# Patient Record
Sex: FEMALE | Race: BLACK OR AFRICAN AMERICAN | NOT HISPANIC OR LATINO | Employment: FULL TIME | URBAN - METROPOLITAN AREA
[De-identification: names, ages, dates, MRNs, and addresses within clinical notes are randomized per-mention and may not be internally consistent; named-entity substitution may affect disease eponyms.]

---

## 2023-03-27 PROBLEM — N91.1 SECONDARY AMENORRHEA: Status: ACTIVE | Noted: 2023-03-27

## 2023-03-27 PROBLEM — S89.92XA LEFT KNEE INJURY, INITIAL ENCOUNTER: Status: ACTIVE | Noted: 2023-03-27

## 2023-03-27 PROBLEM — R05.9 COUGH: Status: ACTIVE | Noted: 2023-03-27

## 2023-03-27 PROBLEM — E66.01 MORBID OBESITY WITH BMI OF 50.0-59.9, ADULT (MULTI): Status: ACTIVE | Noted: 2023-03-27

## 2023-03-27 PROBLEM — R73.03 PRE-DIABETES: Status: ACTIVE | Noted: 2023-03-27

## 2023-03-27 PROBLEM — M25.561 RIGHT KNEE PAIN: Status: ACTIVE | Noted: 2023-03-27

## 2023-03-27 PROBLEM — S93.401A SPRAIN OF ANKLE, RIGHT: Status: ACTIVE | Noted: 2023-03-27

## 2023-03-27 PROBLEM — J00 COMMON COLD: Status: ACTIVE | Noted: 2023-03-27

## 2023-03-27 PROBLEM — E66.01 CLASS 3 OBESITY (MULTI): Status: ACTIVE | Noted: 2023-03-27

## 2023-03-27 PROBLEM — S80.02XA CONTUSION OF LEFT KNEE: Status: ACTIVE | Noted: 2023-03-27

## 2023-03-27 PROBLEM — N39.0 UTI (URINARY TRACT INFECTION): Status: ACTIVE | Noted: 2023-03-27

## 2023-03-27 PROBLEM — E66.813 CLASS 3 OBESITY: Status: ACTIVE | Noted: 2023-03-27

## 2023-03-27 PROBLEM — J02.9 SORE THROAT: Status: ACTIVE | Noted: 2023-03-27

## 2023-03-27 PROBLEM — R10.84 GENERALIZED ABDOMINAL PAIN: Status: ACTIVE | Noted: 2023-03-27

## 2023-03-27 PROBLEM — M23.302 MENISCUS, LATERAL, DERANGEMENT: Status: ACTIVE | Noted: 2023-03-27

## 2023-03-27 PROBLEM — S80.212A KNEE ABRASION, LEFT, INITIAL ENCOUNTER: Status: ACTIVE | Noted: 2023-03-27

## 2023-03-27 PROBLEM — E66.9 OBESITY (BMI 30-39.9): Status: ACTIVE | Noted: 2023-03-27

## 2023-03-27 PROBLEM — J03.90 TONSILLITIS: Status: ACTIVE | Noted: 2023-03-27

## 2023-03-27 RX ORDER — ACETAMINOPHEN 500 MG
1 TABLET ORAL DAILY
COMMUNITY
Start: 2022-10-12 | End: 2023-03-28 | Stop reason: SDUPTHER

## 2023-03-28 ENCOUNTER — OFFICE VISIT (OUTPATIENT)
Dept: PRIMARY CARE | Facility: CLINIC | Age: 33
End: 2023-03-28
Payer: COMMERCIAL

## 2023-03-28 VITALS — TEMPERATURE: 95.1 F | DIASTOLIC BLOOD PRESSURE: 90 MMHG | WEIGHT: 286 LBS | SYSTOLIC BLOOD PRESSURE: 130 MMHG

## 2023-03-28 DIAGNOSIS — R03.0 ELEVATED BLOOD PRESSURE READING: Primary | ICD-10-CM

## 2023-03-28 DIAGNOSIS — E55.9 VITAMIN D DEFICIENCY: ICD-10-CM

## 2023-03-28 PROCEDURE — 99213 OFFICE O/P EST LOW 20 MIN: CPT | Performed by: INTERNAL MEDICINE

## 2023-03-28 RX ORDER — ACETAMINOPHEN 500 MG
5000 TABLET ORAL DAILY
Qty: 30 TABLET | Refills: 2 | Status: SHIPPED | OUTPATIENT
Start: 2023-03-28 | End: 2023-04-27

## 2023-03-28 ASSESSMENT — PATIENT HEALTH QUESTIONNAIRE - PHQ9
SUM OF ALL RESPONSES TO PHQ9 QUESTIONS 1 AND 2: 0
2. FEELING DOWN, DEPRESSED OR HOPELESS: NOT AT ALL
1. LITTLE INTEREST OR PLEASURE IN DOING THINGS: NOT AT ALL

## 2023-03-28 NOTE — ADDENDUM NOTE
Addended by: DOUGLAS DA SILVA on: 3/28/2023 03:35 PM     Modules accepted: Level of Service     Epidermal Closure Graft Donor Site (Optional): simple interrupted

## 2023-03-28 NOTE — PROGRESS NOTES
Subjective   Patient ID: Maryam Huddleston is a 33 y.o. female with Pmhx of vitamin D deficiency is presenting for routine follow-up visit and blood pressure checkup.    HPI  She recently had tonsillitis and at urgent care they informed her that her blood pressure was elevated.  She remembered that even during her routine office visits her blood pressure was elevated, this worried her, so she wants us to check her blood pressure and discuss therapy.  Patient's blood pressure during her previous office visits have been in the 140s/90s in the past, weight 300 pound.  Today her blood pressure is 130/90 and her weight has reduced to 286 pounds.  She states that she has enrolled herself in an exercise program and is watching her diet.  She does have a family history of hypertension in her parents.  Denies episodes of dizziness, headaches, sob, sweating.    Review of Systems   All other systems reviewed and are negative.      Objective   Physical Exam  Vitals reviewed.   HENT:      Head: Normocephalic.   Eyes:      Pupils: Pupils are equal, round, and reactive to light.   Cardiovascular:      Rate and Rhythm: Normal rate and regular rhythm.   Pulmonary:      Effort: Pulmonary effort is normal.      Breath sounds: Normal breath sounds.   Musculoskeletal:         General: Normal range of motion.      Cervical back: Neck supple.   Neurological:      Mental Status: She is oriented to person, place, and time.         Assessment/Plan   Problem List Items Addressed This Visit          Circulatory    Elevated blood pressure reading - Primary     #Elevated blood pressure  BP today 130/90  Weight reduced from 300->286lbs    Plan:  -as she is young, has demostrated ability to lose weight and her BP is better controlled this office visit we will not start her on any medication at this point.  -Buy a blood pressure measuring instrument and keep a record of blood pressure measurements at home.  -Continue to exercise and eat  healthy.  -follow-up in 4 months.               Endocrine/Metabolic    Vitamin D deficiency     -has not been taking vit D at home, so there is no point in checking her levels today.         Relevant Medications    cholecalciferol (Vitamin D-3) 5,000 Units tablet

## 2023-03-28 NOTE — ASSESSMENT & PLAN NOTE
#Elevated blood pressure  BP today 130/90  Weight reduced from 300->286lbs    Plan:  -as she is young, has demostrated ability to lose weight and her BP is better controlled this office visit we will not start her on any medication at this point.  -Buy a blood pressure measuring instrument and keep a record of blood pressure measurements at home.  -Continue to exercise and eat healthy.  -follow-up in 4 months.

## 2024-03-19 ENCOUNTER — APPOINTMENT (OUTPATIENT)
Dept: PRIMARY CARE | Facility: CLINIC | Age: 34
End: 2024-03-19
Payer: COMMERCIAL

## 2024-04-02 ENCOUNTER — APPOINTMENT (OUTPATIENT)
Dept: PRIMARY CARE | Facility: CLINIC | Age: 34
End: 2024-04-02
Payer: COMMERCIAL

## 2024-04-16 ENCOUNTER — TELEMEDICINE (OUTPATIENT)
Dept: PRIMARY CARE | Facility: CLINIC | Age: 34
End: 2024-04-16
Payer: COMMERCIAL

## 2024-04-16 DIAGNOSIS — R13.10 ODYNOPHAGIA: Primary | ICD-10-CM

## 2024-04-16 PROCEDURE — 99213 OFFICE O/P EST LOW 20 MIN: CPT | Performed by: INTERNAL MEDICINE

## 2024-04-16 RX ORDER — IBUPROFEN 600 MG/1
600 TABLET ORAL 3 TIMES DAILY PRN
Qty: 60 TABLET | Refills: 0 | Status: SHIPPED | OUTPATIENT
Start: 2024-04-16 | End: 2025-04-16

## 2024-04-16 RX ORDER — LIDOCAINE HYDROCHLORIDE 20 MG/ML
1.25 SOLUTION OROPHARYNGEAL AS NEEDED
Qty: 100 ML | Refills: 0 | Status: SHIPPED | OUTPATIENT
Start: 2024-04-16 | End: 2024-04-26

## 2024-04-16 NOTE — PROGRESS NOTES
"Chief Complaint:   Chief Complaint   Patient presents with    Dysphagia      HPI    Maryam Huddleston is a 34 y.o. year old female who presents to the clinic for odynophagia for about 1 week  No dysphagia  No URI symptoms  No drooling  No fever      Past Medical History   No past medical history on file.   Past Surgical History:   No past surgical history on file.  Family History:   Family History   Problem Relation Name Age of Onset    Other (htn) Mother      Other (htn) Father       Social History:   Tobacco Use: High Risk (10/30/2023)    Received from [facility]    Patient History     Smoking Tobacco Use: Never     Smokeless Tobacco Use: Current     Passive Exposure: Past      Social History     Substance and Sexual Activity   Alcohol Use Not on file        Allergies:   Allergies   Allergen Reactions    Sulfa (Sulfonamide Antibiotics) Unknown        ROS   Review of Systems     Objective   There were no vitals filed for this visit.   BMI Readings from Last 15 Encounters:   No data found for BMI           Physical Exam  Physical Exam     Labs:  CBC:  Recent Labs     03/26/23  0254 03/22/23  2031 10/11/22  1323   WBC 4.1* 5.5 3.6*   HGB 13.4 12.8 13.4   HCT 41.5 40.0 41.5    221 230   MCV 87 89 89     CMP:  Recent Labs     03/26/23  0254 03/22/23  2031 10/11/22  1323    136 139   K 3.7 4.0 3.9    104 107   CO2 26 23 26   ANIONGAP 14 13 10   BUN 9 10 8   CREATININE 0.69 0.75 0.64   GLUCOSE 94 96 102*     Recent Labs     03/26/23  0254 03/22/23  2031 10/11/22  1323 10/12/18  1524   ALBUMIN 4.5 4.0 4.3  --    ALKPHOS 46 47 44  --    ALT 21 21 14  --    AST 21 19 13  --    BILITOT 0.5 0.8 0.7  --    LIPASE  --   --   --  17     Calcium/Phos:   Lab Results   Component Value Date    CALCIUM 8.9 03/26/2023      COAG:   Recent Labs     03/26/23 0254   INR 1.1     CRP: No results found for: \"CRP\"   [unfilled]   ENDO:  Recent Labs     10/11/22  1323   TSH 1.62   HGBA1C 5.0      CARDIAC:   Recent " Labs     03/22/23  2339 03/22/23  2031   TROPHS 4 4     Recent Labs     10/21/22  1100   CHOL 167   LDLF 106*   HDL 45.7   TRIG 78     No data recorded    Current Medications:  Current Outpatient Medications   Medication Sig Dispense Refill    ibuprofen 600 mg tablet Take 1 tablet (600 mg) by mouth 3 times a day as needed for mild pain (1 - 3) (pain). 60 tablet 0    lidocaine (Xylocaine) 2 % solution Take 1.25 mL by mouth if needed for mild pain (1 - 3) for up to 10 days. 100 mL 0     No current facility-administered medications for this visit.       Assessment and Plan  Maryam was seen today for dysphagia.  Diagnoses and all orders for this visit:  Odynophagia (Primary)  -     lidocaine (Xylocaine) 2 % solution; Take 1.25 mL by mouth if needed for mild pain (1 - 3) for up to 10 days.  -     ibuprofen 600 mg tablet; Take 1 tablet (600 mg) by mouth 3 times a day as needed for mild pain (1 - 3) (pain).     Viral pharyngitis vs allergies  Start Lidocaine mouth wash gargles  Start salk water gargles  Use Ibuprofen PRN  Let us know if not better by Friday        Immunizations:  Immunization History   Administered Date(s) Administered    Tdap vaccine, age 7 year and older (BOOSTRIX, ADACEL) 07/29/2010, 09/16/2019     An interactive audio and video telecommunication system which permits real time communications between the patient (at the originating site) and provider (at the distant site) was utilized to provide this telehealth service.    Verbal consent was requested and obtained from the patient on the day of encounter.  This is a virtual visit using HIPAA compliant video platform. It required patient-provider interaction for the medical decision making as documented.     I have communicated my name and active licensure. The patient's identity and physical location were verified at the time of this visit.   Either the patient or their legal representative has been informed of the risks and benefits of -- and  alternatives to -- treatment through a remote evaluation and consents to proceed with the evaluation remotely.

## 2024-04-23 ENCOUNTER — OFFICE VISIT (OUTPATIENT)
Dept: PRIMARY CARE | Facility: CLINIC | Age: 34
End: 2024-04-23
Payer: COMMERCIAL

## 2024-04-23 ENCOUNTER — LAB (OUTPATIENT)
Dept: LAB | Facility: LAB | Age: 34
End: 2024-04-23
Payer: COMMERCIAL

## 2024-04-23 VITALS — DIASTOLIC BLOOD PRESSURE: 70 MMHG | SYSTOLIC BLOOD PRESSURE: 118 MMHG | WEIGHT: 293 LBS

## 2024-04-23 DIAGNOSIS — Z00.00 HEALTH CARE MAINTENANCE: ICD-10-CM

## 2024-04-23 DIAGNOSIS — Z00.00 HEALTH CARE MAINTENANCE: Primary | ICD-10-CM

## 2024-04-23 LAB
25(OH)D3 SERPL-MCNC: 10 NG/ML (ref 30–100)
ALBUMIN SERPL BCP-MCNC: 4.2 G/DL (ref 3.4–5)
ALP SERPL-CCNC: 44 U/L (ref 33–110)
ALT SERPL W P-5'-P-CCNC: 13 U/L (ref 7–45)
ANION GAP SERPL CALC-SCNC: 13 MMOL/L (ref 10–20)
AST SERPL W P-5'-P-CCNC: 11 U/L (ref 9–39)
BILIRUB SERPL-MCNC: 0.7 MG/DL (ref 0–1.2)
BUN SERPL-MCNC: 11 MG/DL (ref 6–23)
CALCIUM SERPL-MCNC: 9.2 MG/DL (ref 8.6–10.6)
CHLORIDE SERPL-SCNC: 104 MMOL/L (ref 98–107)
CHOLEST SERPL-MCNC: 149 MG/DL (ref 0–199)
CHOLESTEROL/HDL RATIO: 3.2
CO2 SERPL-SCNC: 28 MMOL/L (ref 21–32)
CREAT SERPL-MCNC: 0.65 MG/DL (ref 0.5–1.05)
EGFRCR SERPLBLD CKD-EPI 2021: >90 ML/MIN/1.73M*2
ERYTHROCYTE [DISTWIDTH] IN BLOOD BY AUTOMATED COUNT: 13.2 % (ref 11.5–14.5)
EST. AVERAGE GLUCOSE BLD GHB EST-MCNC: 108 MG/DL
GLUCOSE SERPL-MCNC: 90 MG/DL (ref 74–99)
HBA1C MFR BLD: 5.4 %
HCT VFR BLD AUTO: 39.8 % (ref 36–46)
HDLC SERPL-MCNC: 46.3 MG/DL
HGB BLD-MCNC: 12.9 G/DL (ref 12–16)
LDLC SERPL CALC-MCNC: 87 MG/DL
MCH RBC QN AUTO: 28.2 PG (ref 26–34)
MCHC RBC AUTO-ENTMCNC: 32.4 G/DL (ref 32–36)
MCV RBC AUTO: 87 FL (ref 80–100)
NON HDL CHOLESTEROL: 103 MG/DL (ref 0–149)
NRBC BLD-RTO: 0 /100 WBCS (ref 0–0)
PLATELET # BLD AUTO: 253 X10*3/UL (ref 150–450)
POTASSIUM SERPL-SCNC: 4 MMOL/L (ref 3.5–5.3)
PROT SERPL-MCNC: 7.4 G/DL (ref 6.4–8.2)
RBC # BLD AUTO: 4.57 X10*6/UL (ref 4–5.2)
SODIUM SERPL-SCNC: 141 MMOL/L (ref 136–145)
TRIGL SERPL-MCNC: 79 MG/DL (ref 0–149)
TSH SERPL-ACNC: 1.85 MIU/L (ref 0.44–3.98)
VLDL: 16 MG/DL (ref 0–40)
WBC # BLD AUTO: 3.6 X10*3/UL (ref 4.4–11.3)

## 2024-04-23 PROCEDURE — 99395 PREV VISIT EST AGE 18-39: CPT | Performed by: INTERNAL MEDICINE

## 2024-04-23 PROCEDURE — 82306 VITAMIN D 25 HYDROXY: CPT

## 2024-04-23 PROCEDURE — 84443 ASSAY THYROID STIM HORMONE: CPT

## 2024-04-23 PROCEDURE — 83036 HEMOGLOBIN GLYCOSYLATED A1C: CPT

## 2024-04-23 PROCEDURE — 80053 COMPREHEN METABOLIC PANEL: CPT

## 2024-04-23 PROCEDURE — 36415 COLL VENOUS BLD VENIPUNCTURE: CPT

## 2024-04-23 PROCEDURE — 80061 LIPID PANEL: CPT

## 2024-04-23 PROCEDURE — 85027 COMPLETE CBC AUTOMATED: CPT

## 2024-04-23 NOTE — PROGRESS NOTES
Subjective   Patient ID: Maryam Huddleston is a 34 y.o. female with no significant PMHx, who presents for Annual Exam. Patient follows with OBGYN at [facility]. Works at day care center.   Denies acute complaints. Denies HA, n/v, chest pain, sob, abdominal pain, changes to urination or BM. Would like to discuss options for weight loss medications.     Objective   /70   Wt 135 kg (298 lb)     Physical Exam  Constitutional: Appears stated age. In NAD. Obese appearing.  HEENT: NC/AT, EOMI, clear sclera, moist mucous membranes  CV: RRR, No M/R/G  PULM: CTAB, no coughing or wheezing  ABDOMEN: Soft, NT/ND. No TTP. + BSx4.  SKIN: Normal Color, Warm, Dry, No Rashes   EXTREMITIES: Non-Tender, Full ROM, No Pedal Edema  NEURO: A&O x 4, nonfocal neurological exam.  PSYCH: Normal Mood & Behavior    Assessment/Plan   Problem List Items Addressed This Visit    None  Visit Diagnoses       Health care maintenance    -  Primary    Relevant Orders    CBC    Comprehensive metabolic panel    Hemoglobin A1c    Urinalysis with Reflex Microscopic    Albumin, urine, random    Tsh With Reflex To Free T4 If Abnormal    Lipid panel    Vitamin D 25-Hydroxy,Total (for eval of Vitamin D levels)             Maryam Huddleston is a 34 y.o. female with no significant PMHx, who presents for Annual Exam.     #Obesity  - Will discuss weight loss medications after blood work    #HM  - Annual labs ordered  - UTD with vaccines  - UTD with pap smear, HepC screen, HIV screen. Follows with obgyn at [facility].     Follow up in August 2024

## 2024-04-23 NOTE — PROGRESS NOTES
I reviewed the resident/fellow's documentation and discussed the patient with the resident/fellow. I agree with the resident/fellow's medical decision making as documented in the note.  As the attending physician, I certify that I personally reviewed the patient's history and personally examined the patient to confirm the physical findings described above, and that I reviewed the relevant imaging studies and available reports. I also discussed the differential diagnosis and all of the proposed management plans with the patient and individuals accompanying the patient to this visit. They had the opportunity to ask questions about the proposed management plans and to have those questions answered.     Ai Garzon MD

## 2024-08-20 ENCOUNTER — APPOINTMENT (OUTPATIENT)
Dept: PRIMARY CARE | Facility: CLINIC | Age: 34
End: 2024-08-20

## 2025-06-30 ENCOUNTER — OFFICE VISIT (OUTPATIENT)
Dept: URGENT CARE | Age: 35
End: 2025-06-30
Payer: MEDICAID

## 2025-06-30 ENCOUNTER — HOSPITAL ENCOUNTER (OUTPATIENT)
Dept: RADIOLOGY | Facility: CLINIC | Age: 35
Discharge: HOME | End: 2025-06-30
Payer: MEDICAID

## 2025-06-30 VITALS
DIASTOLIC BLOOD PRESSURE: 91 MMHG | HEART RATE: 104 BPM | OXYGEN SATURATION: 96 % | RESPIRATION RATE: 17 BRPM | SYSTOLIC BLOOD PRESSURE: 165 MMHG | TEMPERATURE: 98.6 F

## 2025-06-30 DIAGNOSIS — S89.92XA INJURY OF LEFT KNEE, INITIAL ENCOUNTER: ICD-10-CM

## 2025-06-30 DIAGNOSIS — S89.92XA INJURY OF LEFT LOWER EXTREMITY, INITIAL ENCOUNTER: Primary | ICD-10-CM

## 2025-06-30 DIAGNOSIS — S89.91XA INJURY OF RIGHT LOWER EXTREMITY, INITIAL ENCOUNTER: ICD-10-CM

## 2025-06-30 DIAGNOSIS — S89.92XA INJURY OF LEFT LOWER EXTREMITY, INITIAL ENCOUNTER: ICD-10-CM

## 2025-06-30 PROCEDURE — 73590 X-RAY EXAM OF LOWER LEG: CPT | Mod: LEFT SIDE | Performed by: STUDENT IN AN ORGANIZED HEALTH CARE EDUCATION/TRAINING PROGRAM

## 2025-06-30 PROCEDURE — 73590 X-RAY EXAM OF LOWER LEG: CPT | Mod: LT

## 2025-06-30 PROCEDURE — 73564 X-RAY EXAM KNEE 4 OR MORE: CPT | Mod: LT

## 2025-06-30 PROCEDURE — 73564 X-RAY EXAM KNEE 4 OR MORE: CPT | Mod: LEFT SIDE | Performed by: STUDENT IN AN ORGANIZED HEALTH CARE EDUCATION/TRAINING PROGRAM

## 2025-06-30 PROCEDURE — 73590 X-RAY EXAM OF LOWER LEG: CPT | Mod: RT

## 2025-06-30 ASSESSMENT — ENCOUNTER SYMPTOMS
ARTHRALGIAS: 1
WOUND: 1
CONSTITUTIONAL NEGATIVE: 1

## 2025-06-30 ASSESSMENT — PAIN SCALES - GENERAL: PAINLEVEL_OUTOF10: 7

## 2025-06-30 NOTE — LETTER
June 30, 2025     Patient: Maryam Huddleston   YOB: 1990   Date of Visit: 6/30/2025       To Whom It May Concern:    Maryam Huddleston was seen in my clinic on 6/30/2025 at 10:55 am. Please excuse Maryam for her absence from work on this day to make the appointment.    If you have any questions or concerns, please don't hesitate to call.         Sincerely,         Toni Gonzalez PA-C        CC: No Recipients

## 2025-06-30 NOTE — PROGRESS NOTES
Subjective   Patient ID: Maryam Huddleston is a 35 y.o. female. They present today with a chief complaint of Fall (Coming down stairs in her apartment today. LT and RT leg pain. LT leg has a gash. LT forearm pain ).    History of Present Illness  35-year-old female presents clinic today with complaints of a fall.  Patient states that she fell going down stairs of her apartment today.  She landed on her left knee and bilateral shins.  She states she is having trouble walking due to the pain.  She states she cut her right shin.  Denies any head injury.  Denies LOC.  Denies nausea or vomiting.  Denies dizziness.  She has not taken anything for the pain.      Trauma  Mechanism of injury: Fall       Past Medical History  Allergies as of 06/30/2025 - Reviewed 06/30/2025   Allergen Reaction Noted    Sulfa (sulfonamide antibiotics) Unknown 03/24/2023       Prescriptions Prior to Admission[1]     Medical History[2]    Surgical History[3]     reports that she has never smoked. She has never used smokeless tobacco. She reports that she does not currently use alcohol. She reports that she does not use drugs.    Review of Systems  Review of Systems   Constitutional: Negative.    Musculoskeletal:  Positive for arthralgias.   Skin:  Positive for wound.                                  Objective    Vitals:    06/30/25 1114   BP: (!) 165/91   BP Location: Right arm   Patient Position: Sitting   Pulse: 104   Resp: 17   Temp: 37 °C (98.6 °F)   TempSrc: Oral   SpO2: 96%     No LMP recorded (lmp unknown).    Physical Exam  Constitutional:       Appearance: Normal appearance.   Musculoskeletal:      Comments: Tenderness to bilateral tib-fib midshaft, left knee, full range of motion   Skin:     Comments: Vertical abrasion to right shin, nongaping   Neurological:      Mental Status: She is alert.         Procedures    Point of Care Test & Imaging Results from this visit  No results found for this visit on 06/30/25.   Imaging  No results  found.    Cardiology, Vascular, and Other Imaging  No other imaging results found for the past 2 days      Diagnostic study results (if any) were reviewed by Toni Gonzalez PA-C.    Assessment/Plan   Allergies, medications, history, and pertinent labs/EKGs/Imaging reviewed by Toni Gonzalez PA-C.     Medical Decision Making  Patient pleasant cooperative on examination.    On examination there is tenderness to bilateral tibial/fibular shafts along with the left knee.    X-rays were obtained and negative all 3.    There was a abrasion to the right shin which was properly cleaned in office.  Tetanus was last given in 2019.    I advised patient on proper wound management.  I also advised her to ice as well as taking Tylenol ibuprofen for the joint discomfort.    Monitor for worsening or persistent signs.    Patient agreement with plan.  Patient alert and oriented, no acute distress upon discharge.    Orders and Diagnoses  There are no diagnoses linked to this encounter.    Medical Admin Record      Patient disposition: Home    Electronically signed by Toni Gonzalez PA-C  11:32 AM           [1] (Not in a hospital admission)  [2] History reviewed. No pertinent past medical history.  [3] History reviewed. No pertinent surgical history.

## 2025-06-30 NOTE — PATIENT INSTRUCTIONS
Rest affected extremity.  Ice injured area 20 min on, 20 off and repeat. ACE wrap.  Elevate.     Do the above for the next 1-2 days.     Warm compresses, stretching after 2 days     Ibuprofen and/or Tylenol for pain and inflammation.     Please follow up with PCP or Ortho if symptoms persist      Ortho [phone]      Apply thin layer of bacitracin or Neosporin 2-3 times daily changing bandage each time.     Do not leave bandage on for greater than 24 hours    Watch for signs of infection this includes increasing redness, increasing pain, increasing swelling, pus or drainage from the wound. If you develope these symptoms I recommend you  follow-up for further evaluation and treatment.     Avoid aggressive scrubbing. If the area gets wet you may pat dry.

## 2025-07-10 ENCOUNTER — HOSPITAL ENCOUNTER (EMERGENCY)
Facility: HOSPITAL | Age: 35
Discharge: HOME | End: 2025-07-10
Payer: MEDICAID

## 2025-07-10 VITALS
BODY MASS INDEX: 49.51 KG/M2 | OXYGEN SATURATION: 100 % | HEART RATE: 86 BPM | DIASTOLIC BLOOD PRESSURE: 95 MMHG | RESPIRATION RATE: 18 BRPM | WEIGHT: 290 LBS | TEMPERATURE: 97.2 F | SYSTOLIC BLOOD PRESSURE: 157 MMHG | HEIGHT: 64 IN

## 2025-07-10 DIAGNOSIS — S81.801D WOUND OF RIGHT LOWER EXTREMITY, SUBSEQUENT ENCOUNTER: Primary | ICD-10-CM
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
## 2025-07-10 DIAGNOSIS — R03.0 ELEVATED BP WITHOUT DIAGNOSIS OF HYPERTENSION: ICD-10-CM

## 2025-07-10 PROCEDURE — 99283 EMERGENCY DEPT VISIT LOW MDM: CPT

## 2025-07-10 PROCEDURE — 99281 EMR DPT VST MAYX REQ PHY/QHP: CPT

## 2025-07-10 RX ORDER — CEPHALEXIN 500 MG/1
500 CAPSULE ORAL 3 TIMES DAILY
Qty: 21 CAPSULE | Refills: 0 | Status: SHIPPED | OUTPATIENT
Start: 2025-07-10 | End: 2025-07-17

## 2025-07-10 ASSESSMENT — PAIN - FUNCTIONAL ASSESSMENT: PAIN_FUNCTIONAL_ASSESSMENT: 0-10

## 2025-07-10 ASSESSMENT — PAIN SCALES - GENERAL: PAINLEVEL_OUTOF10: 4

## 2025-07-10 ASSESSMENT — PAIN DESCRIPTION - ORIENTATION: ORIENTATION: RIGHT

## 2025-07-10 ASSESSMENT — PAIN DESCRIPTION - LOCATION: LOCATION: LEG

## 2025-07-10 NOTE — ED TRIAGE NOTES
Pt c/o RLE injury last week. Pt had mechanical fall when she tripped down the stairs. Pt seen at , given bacitracin and wound supplies. Pt denies drainage but wound is red, swollen, and painful. 1200mg ibu PTA

## 2025-07-10 NOTE — Clinical Note
Brandt from [employer] calling on status of paperwork Faxed on 6-18-21.  20 pages  RE:  For scooter  Please return the call regarding      Maryam Huddleston was seen and treated in our emergency department on 7/10/2025.  She may return to work on 07/12/2025.       If you have any questions or concerns, please don't hesitate to call.      Shirlene Paul PA-C

## 2025-07-11 VITALS
BODY MASS INDEX: 49.51 KG/M2 | DIASTOLIC BLOOD PRESSURE: 137 MMHG | WEIGHT: 290 LBS | HEART RATE: 71 BPM | HEIGHT: 64 IN | OXYGEN SATURATION: 99 % | SYSTOLIC BLOOD PRESSURE: 169 MMHG | RESPIRATION RATE: 18 BRPM | TEMPERATURE: 98.2 F

## 2025-07-11 PROCEDURE — 99283 EMERGENCY DEPT VISIT LOW MDM: CPT | Performed by: EMERGENCY MEDICINE

## 2025-07-11 ASSESSMENT — PAIN SCALES - GENERAL: PAINLEVEL_OUTOF10: 7

## 2025-07-11 ASSESSMENT — PAIN - FUNCTIONAL ASSESSMENT: PAIN_FUNCTIONAL_ASSESSMENT: 0-10

## 2025-07-11 ASSESSMENT — PAIN DESCRIPTION - LOCATION: LOCATION: HEAD

## 2025-07-11 ASSESSMENT — PAIN DESCRIPTION - PAIN TYPE: TYPE: ACUTE PAIN

## 2025-07-11 NOTE — ED PROVIDER NOTES
"Chief Complaint   Patient presents with    Leg Injury     HPI:   Maryam Huddleston is an 35 y.o. female with history of prediabetes who presents to the ED for evaluation of right lower extremity wound and pain.  Patient reports that on June 30 she slid down 6 or 7 tile stairs with a metal edges.  Sustained laceration to the anterior right shin along with multiple other bruises.  Was seen at urgent care on day of accident and had negative x-rays.  Was discharged with prescription for bacitracin.  Since then has had intermittent pain that is controlled with ibuprofen.  Pain is worse when walking or driving.  Describes it as \"tightness\".  Last took 1200 mg of ibuprofen just before arrival.  Is concerned because pain is not going away and she is not sure if her clot looks normal.  Denies any back pain, neck pain, headaches, dizziness lightheadedness, syncope.  Has been using soap and water, hydrogen peroxide to cleanse wound.  States that it has been red around the edges of the wound since it occurred.  She denies any fevers, chills, lymphangitic streaking.    Medications: Mirena IUD  Soc HX:  RX Allergies[1]: NKDA     Physical Exam  Vitals and nursing note reviewed.   Constitutional:       General: She is not in acute distress.     Appearance: She is not ill-appearing or toxic-appearing.      Comments: Pleasant.  Elevated BMI   Eyes:      Pupils: Pupils are equal, round, and reactive to light.   Cardiovascular:      Rate and Rhythm: Normal rate and regular rhythm.      Pulses: Normal pulses.      Heart sounds: Normal heart sounds.      Comments: Negative Cinthia bilaterally.  Pulmonary:      Effort: Pulmonary effort is normal. No respiratory distress.      Breath sounds: Normal breath sounds.   Musculoskeletal:         General: Signs of injury present. No deformity. Normal range of motion.      Comments: Normal active ROM of right knee and ankle.   Skin:     General: Skin is warm and dry.      Capillary Refill: Capillary " refill takes less than 2 seconds.      Findings: Erythema present. No bruising.      Comments: Roughly 5 cm scab right mid anterior shin with roughly 4 cm surrounding erythema.  No warmth.  See attached images.   Neurological:      Mental Status: She is alert.      Cranial Nerves: No cranial nerve deficit.      Sensory: No sensory deficit.      Motor: No weakness.      Gait: Gait normal.                 VS: As documented in the triage note and EMR flowsheet from this visit were reviewed.    External Records Reviewed: I reviewed recent and relevant outside records including: Reviewed x-ray imaging 6/30/2025.   IMPRESSION:  No osseous abnormality left knee or bilateral tibia and fibula.  Mild nonspecific left pretibial soft tissue swelling concerning for  contusion/hematoma in the setting of trauma.  Mild degenerative change of the medial compartment of the knee.  Prominent soft tissue calcifications posteromedial right knee are  increased relative to comparison right knee radiographs and correlate  with joint bodies in a ruptured popliteal cyst.      Medical Decision Making:   ED Course as of 07/11/25 0504   Thu Jul 10, 2025   2005 Vitals Reviewed: Afebrile. hypertensive. Not tachypneic.  [KA]   1106 Patient is a pleasant 35-year-old female who presents to the ED for evaluation of right shin wound.  On exam she has a roughly 5 cm wound with black eschar on the mid anterior right shin.  There is about 4 cm of surrounding erythema.  No warmth when compared to other side.  I suspect that it is natural stage of healing; however, outlined the area and gave patient paper prescription for Keflex.  She is to start antibiotic if redness expands or if she develops fever.  She is to come back to the ED if she develops any lymphangitic streaking.  We discussed x-ray imaging and through shared decision making, decided against repeat imaging at this time.  Advised patient to stop using hydrogen peroxide on the wound.  Can continue  OTC topical antibiotic along with soap and water.  Will place referral to wound care.  Of note, patient does not have history of hypertension.  She is not currently complaining of any signs or symptoms concerning for hypertensive emergency.  We discussed follow-up with PCP for blood pressure recheck.  Also advised patient that 600 mg ibuprofen per dose is max and that she can take up to 2400 mg/day.  Patient agreeable with plan. [KA]      ED Course User Index  [KA] Shirlene Paul PA-C         Diagnoses as of 07/11/25 0504   Wound of right lower extremity, subsequent encounter   Fall down steps, subsequent encounter   Elevated BP without diagnosis of hypertension      Escalation of Care: Appropriate for outpatient management   Counseling: Spoke with the patient and discussed today´s findings, in addition to providing specific details for the plan of care and expected course.  Patient was given the opportunity to ask questions.    Discussed return precautions and importance of follow-up.  Advised to follow-up with PCP.  Advised to return to the ED for changing or worsening symptoms, new symptoms, complaint specific precautions, and precautions listed on the discharge paperwork.  Educated on the common potential side effects of medications prescribed.    I advised the patient that the emergency evaluation and treatment provided today doesn't end their need for medical care. It is very important that they follow-up with their primary care provider or other specialist as instructed.    The plan of care was mutually agreed upon with the patient. The patient and/or family were given the opportunity to ask questions. All questions asked today in the ED were answered to the best of my ability with today's information.    I specifically advised the patient to return to the ED for changing or worsening symptoms, worrisome new symptoms, or for any complaint specific precautions listed on the discharge paperwork.    This  patient was cared for in the setting of nationwide stress on resources and staffing.    This report was transcribed using voice recognition software.  Every effort was made to ensure accuracy, however, inadvertently computerized transcription errors may be present.             [1]   Allergies  Allergen Reactions    Sulfa (Sulfonamide Antibiotics) Sharon Paul PA-C  07/11/25 0507

## 2025-07-11 NOTE — DISCHARGE INSTRUCTIONS
Please continue supportive care including Tylenol 1 g every 4-6 hours and/or ibuprofen 600 mg every 6-8 hours as needed for pain.  Use gentle soap and water 1-2 times daily to clean wound.  May then use Q-tip to apply topical antibiotic ointment over the edge of the wound.  Do not use hydrogen peroxide or rubbing alcohol on the wound.    If redness extends outside marked area, you develop a fever of 100.4 °F or higher, or you notice red lines streaking up the leg please begin taking antibiotic and come to ED for reevaluation.    Follow-up with primary care doctor for blood pressure recheck.  If remains elevated they may want to talk to about blood pressure medication to reduce your risk of heart attack, stroke, death, blindness, kidney failure.

## 2025-07-12 ENCOUNTER — HOSPITAL ENCOUNTER (EMERGENCY)
Facility: HOSPITAL | Age: 35
Discharge: HOME | End: 2025-07-12
Attending: EMERGENCY MEDICINE
Payer: MEDICAID

## 2025-07-12 ENCOUNTER — APPOINTMENT (OUTPATIENT)
Dept: CARDIOLOGY | Facility: HOSPITAL | Age: 35
End: 2025-07-12
Payer: MEDICAID

## 2025-07-12 DIAGNOSIS — R51.9 NONINTRACTABLE HEADACHE, UNSPECIFIED CHRONICITY PATTERN, UNSPECIFIED HEADACHE TYPE: Primary | ICD-10-CM

## 2025-07-12 PROCEDURE — 93005 ELECTROCARDIOGRAM TRACING: CPT

## 2025-07-12 PROCEDURE — 2500000001 HC RX 250 WO HCPCS SELF ADMINISTERED DRUGS (ALT 637 FOR MEDICARE OP): Performed by: EMERGENCY MEDICINE

## 2025-07-12 RX ORDER — ACETAMINOPHEN 325 MG/1
975 TABLET ORAL ONCE
Status: COMPLETED | OUTPATIENT
Start: 2025-07-12 | End: 2025-07-12

## 2025-07-12 RX ADMIN — ACETAMINOPHEN 975 MG: 325 TABLET ORAL at 01:51

## 2025-07-12 NOTE — ED PROVIDER NOTES
HPI   Chief Complaint   Patient presents with   • Headache       The patient has no prior medical problems.  She was just to yesterday for injury to her leg to make sure is unaffected.  Denies any fever, cough, Levy diarrhea constipation.  She has no chest pain, shortness breath, syncope.  She denies any numbness, weakness, vision changes.  She states that he had a headache has not dissipated over the last the wait in the ER for the last 3 hours.  She states is currently 1 out of 10 pain.            Patient History   Medical History[1]  Surgical History[2]  Family History[3]  Social History[4]    Physical Exam   ED Triage Vitals [07/11/25 2331]   Temperature Heart Rate Respirations BP   36.8 °C (98.2 °F) 71 18 (!) 169/137      Pulse Ox Temp Source Heart Rate Source Patient Position   99 % Oral -- --      BP Location FiO2 (%)     -- --       Physical Exam  Vitals and nursing note reviewed.   Constitutional:       General: She is not in acute distress.     Appearance: She is well-developed.   HENT:      Head: Normocephalic and atraumatic.      Nose: Nose normal.      Mouth/Throat:      Mouth: Mucous membranes are moist.   Eyes:      General: No visual field deficit.     Conjunctiva/sclera: Conjunctivae normal.   Cardiovascular:      Rate and Rhythm: Regular rhythm.      Heart sounds: No murmur heard.  Pulmonary:      Effort: Pulmonary effort is normal. No respiratory distress.   Abdominal:      General: There is no distension.      Palpations: Abdomen is soft.      Tenderness: There is no right CVA tenderness or left CVA tenderness.   Musculoskeletal:         General: No swelling.      Cervical back: Neck supple.   Skin:     General: Skin is warm and dry.      Comments: There is an 8 x 2 cm wound of the right lower extremity with minimal 0.5 cm surrounding erythema and is not going beyond the line drawn yesterday   Neurological:      General: No focal deficit present.      Mental Status: She is alert and oriented to  person, place, and time.      GCS: GCS eye subscore is 4. GCS verbal subscore is 5. GCS motor subscore is 6.      Cranial Nerves: No cranial nerve deficit, dysarthria or facial asymmetry.      Sensory: No sensory deficit.      Motor: No weakness.      Coordination: Coordination normal.   Psychiatric:         Mood and Affect: Mood normal.         ED Course & MDM   Diagnoses as of 07/12/25 0129   Nonintractable headache, unspecified chronicity pattern, unspecified headache type                 No data recorded     [place] Coma Scale Score: 15 (07/11/25 2333 : Charlotte Fuchs RN)                           Medical Decision Making  The patient has a benign exam.  NIH of 0.  Do not feel that she requires any CT imaging of the head today.  EKG with her higher blood pressure numbers today but ultimately absence of any symptoms feel she be discharged home follow-up with her PCP.  She is can see them in August.  I do not believe the patient has any signs of sepsis or any signs of worsening cellulitis requiring additional antibiotics or admission at this time.      EKG interpreted by myself.  Normal sinus rhythm at a rate of 68 bpm.  Normal intervals.  Normal axis.  No signs of acute ischemia.      Procedure  Procedures           [1]  No past medical history on file.  [2]  No past surgical history on file.  [3]  Family History  Problem Relation Name Age of Onset   • Other (htn) Mother     • Other (htn) Father     [4]  Social History  Tobacco Use   • Smoking status: Never   • Smokeless tobacco: Never   Substance Use Topics   • Alcohol use: Not Currently   • Drug use: Never

## 2025-07-12 NOTE — ED TRIAGE NOTES
Pt from private vehicle c/c of headache and c/c with high BP. Pt states that she was seen here yesterday for leg injury and when she was discharged her BP was elevated. Today she started to have headache and took her bp at home was 155/100. Pt is not diagnosed with high BP     Pt A&Ox3, pt alert calm cooperative with care. Pt resp even and unlabored.     PMH: n/a

## 2025-07-14 LAB
ATRIAL RATE: 68 BPM
P AXIS: 58 DEGREES
P OFFSET: 196 MS
P ONSET: 146 MS
PR INTERVAL: 156 MS
Q ONSET: 224 MS
QRS COUNT: 11 BEATS
QRS DURATION: 84 MS
QT INTERVAL: 400 MS
QTC CALCULATION(BAZETT): 425 MS
QTC FREDERICIA: 417 MS
R AXIS: 32 DEGREES
T AXIS: 30 DEGREES
T OFFSET: 424 MS
VENTRICULAR RATE: 68 BPM

## 2025-07-15 ENCOUNTER — OFFICE VISIT (OUTPATIENT)
Dept: WOUND CARE | Facility: CLINIC | Age: 35
End: 2025-07-15
Payer: MEDICAID

## 2025-07-15 DIAGNOSIS — L03.115 CELLULITIS OF RIGHT LOWER EXTREMITY: Primary | ICD-10-CM

## 2025-07-15 PROCEDURE — 11043 DBRDMT MUSC&/FSCA 1ST 20/<: CPT

## 2025-07-15 PROCEDURE — 99213 OFFICE O/P EST LOW 20 MIN: CPT | Mod: 25

## 2025-07-15 PROCEDURE — 11043 DBRDMT MUSC&/FSCA 1ST 20/<: CPT | Performed by: SURGERY

## 2025-07-19 LAB
BACTERIA SPEC AEROBE CULT: ABNORMAL
BACTERIA SPEC ANAEROBE CULT: ABNORMAL
QUEST TRACKING HOUSE ACCOUNT: NORMAL

## 2025-07-29 ENCOUNTER — OFFICE VISIT (OUTPATIENT)
Dept: WOUND CARE | Facility: CLINIC | Age: 35
End: 2025-07-29
Payer: MEDICAID

## 2025-07-29 PROCEDURE — 11042 DBRDMT SUBQ TIS 1ST 20SQCM/<: CPT

## 2025-07-29 PROCEDURE — 11042 DBRDMT SUBQ TIS 1ST 20SQCM/<: CPT | Performed by: SURGERY

## 2025-08-06 ENCOUNTER — APPOINTMENT (OUTPATIENT)
Dept: PRIMARY CARE | Facility: CLINIC | Age: 35
End: 2025-08-06
Payer: MEDICAID

## 2025-08-06 VITALS
HEART RATE: 72 BPM | DIASTOLIC BLOOD PRESSURE: 94 MMHG | SYSTOLIC BLOOD PRESSURE: 143 MMHG | WEIGHT: 290 LBS | HEIGHT: 64 IN | BODY MASS INDEX: 49.51 KG/M2

## 2025-08-06 DIAGNOSIS — Z00.00 ANNUAL PHYSICAL EXAM: Primary | ICD-10-CM

## 2025-08-06 DIAGNOSIS — E66.813 CLASS 3 SEVERE OBESITY WITHOUT SERIOUS COMORBIDITY WITH BODY MASS INDEX (BMI) OF 45.0 TO 49.9 IN ADULT, UNSPECIFIED OBESITY TYPE: ICD-10-CM

## 2025-08-06 PROBLEM — E66.9 OBESITY WITHOUT SERIOUS COMORBIDITY: Status: ACTIVE | Noted: 2025-08-06

## 2025-08-06 PROCEDURE — 99395 PREV VISIT EST AGE 18-39: CPT | Performed by: INTERNAL MEDICINE

## 2025-08-06 PROCEDURE — 1036F TOBACCO NON-USER: CPT | Performed by: INTERNAL MEDICINE

## 2025-08-06 PROCEDURE — 3008F BODY MASS INDEX DOCD: CPT | Performed by: INTERNAL MEDICINE

## 2025-08-06 ASSESSMENT — ENCOUNTER SYMPTOMS
GASTROINTESTINAL NEGATIVE: 1
CONSTITUTIONAL NEGATIVE: 1
DEPRESSION: 0
MUSCULOSKELETAL NEGATIVE: 1
PSYCHIATRIC NEGATIVE: 1
NEUROLOGICAL NEGATIVE: 1
OCCASIONAL FEELINGS OF UNSTEADINESS: 0
CARDIOVASCULAR NEGATIVE: 1
RESPIRATORY NEGATIVE: 1
LOSS OF SENSATION IN FEET: 0

## 2025-08-06 ASSESSMENT — PROMIS GLOBAL HEALTH SCALE
RATE_AVERAGE_PAIN: 0
EMOTIONAL_PROBLEMS: RARELY
RATE_PHYSICAL_HEALTH: FAIR
CARRYOUT_PHYSICAL_ACTIVITIES: COMPLETELY
RATE_GENERAL_HEALTH: FAIR
RATE_AVERAGE_FATIGUE: MILD
RATE_MENTAL_HEALTH: VERY GOOD
CARRYOUT_SOCIAL_ACTIVITIES: VERY GOOD
RATE_QUALITY_OF_LIFE: GOOD
RATE_SOCIAL_SATISFACTION: VERY GOOD

## 2025-08-06 ASSESSMENT — PATIENT HEALTH QUESTIONNAIRE - PHQ9
2. FEELING DOWN, DEPRESSED OR HOPELESS: NOT AT ALL
1. LITTLE INTEREST OR PLEASURE IN DOING THINGS: NOT AT ALL
SUM OF ALL RESPONSES TO PHQ9 QUESTIONS 1 AND 2: 0

## 2025-08-06 ASSESSMENT — LIFESTYLE VARIABLES
HOW MANY STANDARD DRINKS CONTAINING ALCOHOL DO YOU HAVE ON A TYPICAL DAY: 1 OR 2
HOW OFTEN DO YOU HAVE A DRINK CONTAINING ALCOHOL: MONTHLY OR LESS
AUDIT-C TOTAL SCORE: 1
HOW OFTEN DO YOU HAVE SIX OR MORE DRINKS ON ONE OCCASION: NEVER
SKIP TO QUESTIONS 9-10: 1

## 2025-08-06 NOTE — PROGRESS NOTES
"Subjective     Maryam Huddleston is a 35 y.o. female who presents for her adult physical visit. She had an elevated BP reading at her wound care clinic and in office today. She denies headache, chest pain, palpitations and shortness of breath. She is recovering from a laceration to her lower lateral right shin and healing well.        Review of Systems   Constitutional: Negative.    HENT: Negative.     Respiratory: Negative.     Cardiovascular: Negative.    Gastrointestinal: Negative.    Genitourinary: Negative.    Musculoskeletal: Negative.    Skin: Negative.    Neurological: Negative.    Psychiatric/Behavioral: Negative.         Objective   BP (!) 143/94 (BP Location: Left arm, Patient Position: Sitting, BP Cuff Size: Thigh)   Pulse 72   Ht 1.626 m (5' 4\")   Wt 132 kg (290 lb)   LMP 07/11/2025 (Exact Date)   BMI 49.78 kg/m²     Physical Exam  Constitutional:       Appearance: Normal appearance. She is obese.     Cardiovascular:      Rate and Rhythm: Normal rate and regular rhythm.   Pulmonary:      Effort: Pulmonary effort is normal.      Breath sounds: Normal breath sounds.   Abdominal:      Palpations: Abdomen is soft.      Tenderness: There is no abdominal tenderness.     Musculoskeletal:      Right lower leg: No edema.      Left lower leg: No edema.     Skin:     General: Skin is warm and dry.      Capillary Refill: Capillary refill takes less than 2 seconds.      Comments: Wrapped wound of right lower shin     Neurological:      General: No focal deficit present.      Mental Status: She is alert and oriented to person, place, and time.     Psychiatric:         Mood and Affect: Mood normal.         Behavior: Behavior normal.         Assessment/Plan   Problem List Items Addressed This Visit          Medium    Obesity without serious comorbidity    Relevant Orders    Hemoglobin A1c    Comprehensive metabolic panel    CBC    Vitamin D 25-Hydroxy,Total (for eval of Vitamin D levels)    Lipid panel    Vitamin " B12    Tsh With Reflex To Free T4 If Abnormal    Annual physical exam - Primary     Assessment and Plan    Maryam Huddleston is a 35 y.o. female who has a PMH of morbid obesity.     #Morbid Obesity   -Current terence 290  -BMI 49.78  -Ordered TSH, lipid panel, vitamin D, and vitamin B-12    #Prediabetes   -Hx of gestational diabetes   -Ordered A1c    #Elevated blood pressure in office  -Will monitor at home  -Ordered CBC, CMP    #Right leg wound  -Being managed by wound care  -Healing well    #Health Maintenance  -Will schedule next appointment after lab results reviewed    Devin Callaway MD  Internal Medicine, PGY-1

## 2025-08-07 LAB
25(OH)D3+25(OH)D2 SERPL-MCNC: 17 NG/ML (ref 30–100)
ALBUMIN SERPL-MCNC: 4.5 G/DL (ref 3.6–5.1)
ALP SERPL-CCNC: 41 U/L (ref 31–125)
ALT SERPL-CCNC: 13 U/L (ref 6–29)
ANION GAP SERPL CALCULATED.4IONS-SCNC: 7 MMOL/L (CALC) (ref 7–17)
AST SERPL-CCNC: 12 U/L (ref 10–30)
BILIRUB SERPL-MCNC: 1 MG/DL (ref 0.2–1.2)
BUN SERPL-MCNC: 8 MG/DL (ref 7–25)
CALCIUM SERPL-MCNC: 9.1 MG/DL (ref 8.6–10.2)
CHLORIDE SERPL-SCNC: 105 MMOL/L (ref 98–110)
CHOLEST SERPL-MCNC: 146 MG/DL
CHOLEST/HDLC SERPL: 3.2 (CALC)
CO2 SERPL-SCNC: 27 MMOL/L (ref 20–32)
CREAT SERPL-MCNC: 0.69 MG/DL (ref 0.5–0.97)
EGFRCR SERPLBLD CKD-EPI 2021: 117 ML/MIN/1.73M2
ERYTHROCYTE [DISTWIDTH] IN BLOOD BY AUTOMATED COUNT: 12.7 % (ref 11–15)
EST. AVERAGE GLUCOSE BLD GHB EST-MCNC: 111 MG/DL
EST. AVERAGE GLUCOSE BLD GHB EST-SCNC: 6.2 MMOL/L
GLUCOSE SERPL-MCNC: 86 MG/DL (ref 65–139)
HBA1C MFR BLD: 5.5 %
HCT VFR BLD AUTO: 42.4 % (ref 35–45)
HDLC SERPL-MCNC: 45 MG/DL
HGB BLD-MCNC: 13.7 G/DL (ref 11.7–15.5)
LDLC SERPL CALC-MCNC: 86 MG/DL (CALC)
MCH RBC QN AUTO: 28.5 PG (ref 27–33)
MCHC RBC AUTO-ENTMCNC: 32.3 G/DL (ref 32–36)
MCV RBC AUTO: 88.1 FL (ref 80–100)
NONHDLC SERPL-MCNC: 101 MG/DL (CALC)
PLATELET # BLD AUTO: 236 THOUSAND/UL (ref 140–400)
PMV BLD REES-ECKER: 11.6 FL (ref 7.5–12.5)
POTASSIUM SERPL-SCNC: 3.9 MMOL/L (ref 3.5–5.3)
PROT SERPL-MCNC: 7.5 G/DL (ref 6.1–8.1)
RBC # BLD AUTO: 4.81 MILLION/UL (ref 3.8–5.1)
SODIUM SERPL-SCNC: 139 MMOL/L (ref 135–146)
TRIGL SERPL-MCNC: 61 MG/DL
TSH SERPL-ACNC: 0.91 MIU/L
VIT B12 SERPL-MCNC: 260 PG/ML (ref 200–1100)
WBC # BLD AUTO: 3.2 THOUSAND/UL (ref 3.8–10.8)

## 2025-08-08 ENCOUNTER — RESULTS FOLLOW-UP (OUTPATIENT)
Dept: PRIMARY CARE | Facility: CLINIC | Age: 35
End: 2025-08-08
Payer: MEDICAID

## 2025-08-08 DIAGNOSIS — E55.9 HYPOVITAMINOSIS D: Primary | ICD-10-CM

## 2025-08-08 RX ORDER — ACETAMINOPHEN 500 MG
125 TABLET ORAL DAILY
Qty: 30 TABLET | Refills: 2 | Status: SHIPPED | OUTPATIENT
Start: 2025-08-08

## 2025-08-08 NOTE — TELEPHONE ENCOUNTER
Spoke to Ms. Huddleston about her low vitamin D and B-12 levels. She would like vitamin D prescribed and has been scheduled for her first B-12 injection.

## 2025-08-12 ENCOUNTER — OFFICE VISIT (OUTPATIENT)
Dept: WOUND CARE | Facility: CLINIC | Age: 35
End: 2025-08-12
Payer: MEDICAID

## 2025-08-12 ENCOUNTER — APPOINTMENT (OUTPATIENT)
Dept: PRIMARY CARE | Facility: CLINIC | Age: 35
End: 2025-08-12
Payer: MEDICAID

## 2025-08-12 DIAGNOSIS — E53.8 VITAMIN B12 DEFICIENCY: ICD-10-CM

## 2025-08-12 PROCEDURE — 11042 DBRDMT SUBQ TIS 1ST 20SQCM/<: CPT

## 2025-08-12 PROCEDURE — 96372 THER/PROPH/DIAG INJ SC/IM: CPT | Performed by: INTERNAL MEDICINE

## 2025-08-12 PROCEDURE — 11042 DBRDMT SUBQ TIS 1ST 20SQCM/<: CPT | Performed by: SURGERY

## 2025-08-12 RX ORDER — CYANOCOBALAMIN 1000 UG/ML
1000 INJECTION, SOLUTION INTRAMUSCULAR; SUBCUTANEOUS ONCE
Status: COMPLETED | OUTPATIENT
Start: 2025-08-12 | End: 2025-08-12

## 2025-08-12 RX ADMIN — CYANOCOBALAMIN 1000 MCG: 1000 INJECTION, SOLUTION INTRAMUSCULAR; SUBCUTANEOUS at 08:04

## 2025-08-26 ENCOUNTER — OFFICE VISIT (OUTPATIENT)
Dept: WOUND CARE | Facility: CLINIC | Age: 35
End: 2025-08-26
Payer: MEDICAID

## 2025-08-26 PROCEDURE — 11042 DBRDMT SUBQ TIS 1ST 20SQCM/<: CPT | Performed by: SURGERY

## 2025-08-26 PROCEDURE — 11042 DBRDMT SUBQ TIS 1ST 20SQCM/<: CPT
